# Patient Record
Sex: FEMALE | Race: BLACK OR AFRICAN AMERICAN | NOT HISPANIC OR LATINO | URBAN - METROPOLITAN AREA
[De-identification: names, ages, dates, MRNs, and addresses within clinical notes are randomized per-mention and may not be internally consistent; named-entity substitution may affect disease eponyms.]

---

## 2024-08-01 PROBLEM — I95.9 HYPOTENSION: Status: ACTIVE | Noted: 2024-08-01

## 2024-09-03 ENCOUNTER — EMERGENCY (EMERGENCY)
Facility: HOSPITAL | Age: 54
LOS: 1 days | Discharge: HOME | End: 2024-09-03
Attending: EMERGENCY MEDICINE | Admitting: EMERGENCY MEDICINE
Payer: COMMERCIAL

## 2024-09-03 VITALS
RESPIRATION RATE: 16 BRPM | DIASTOLIC BLOOD PRESSURE: 80 MMHG | SYSTOLIC BLOOD PRESSURE: 116 MMHG | HEART RATE: 88 BPM | WEIGHT: 154.98 LBS | OXYGEN SATURATION: 99 % | TEMPERATURE: 98 F | HEIGHT: 66 IN

## 2024-09-03 VITALS
SYSTOLIC BLOOD PRESSURE: 122 MMHG | RESPIRATION RATE: 17 BRPM | TEMPERATURE: 98 F | OXYGEN SATURATION: 97 % | HEART RATE: 92 BPM | DIASTOLIC BLOOD PRESSURE: 67 MMHG

## 2024-09-03 PROBLEM — K52.9 COLITIS: Status: ACTIVE | Noted: 2024-09-03

## 2024-09-03 PROBLEM — R19.7 BLOODY DIARRHEA: Status: ACTIVE | Noted: 2024-09-03

## 2024-09-03 LAB
ALBUMIN SERPL ELPH-MCNC: 4.3 G/DL — SIGNIFICANT CHANGE UP (ref 3.3–5)
ALP SERPL-CCNC: 49 U/L — SIGNIFICANT CHANGE UP (ref 40–120)
ALT FLD-CCNC: 20 U/L — SIGNIFICANT CHANGE UP (ref 10–45)
ANION GAP SERPL CALC-SCNC: 10 MMOL/L — SIGNIFICANT CHANGE UP (ref 5–17)
APPEARANCE UR: ABNORMAL
APTT BLD: 29.6 SEC — SIGNIFICANT CHANGE UP (ref 24.5–35.6)
AST SERPL-CCNC: 22 U/L — SIGNIFICANT CHANGE UP (ref 10–40)
BACTERIA # UR AUTO: NEGATIVE /HPF — SIGNIFICANT CHANGE UP
BASOPHILS # BLD AUTO: 0.03 K/UL — SIGNIFICANT CHANGE UP (ref 0–0.2)
BASOPHILS NFR BLD AUTO: 0.5 % — SIGNIFICANT CHANGE UP (ref 0–2)
BILIRUB SERPL-MCNC: 0.8 MG/DL — SIGNIFICANT CHANGE UP (ref 0.2–1.2)
BILIRUB UR-MCNC: ABNORMAL
BLD GP AB SCN SERPL QL: POSITIVE — SIGNIFICANT CHANGE UP
BUN SERPL-MCNC: 25 MG/DL — HIGH (ref 7–23)
CALCIUM SERPL-MCNC: 9.5 MG/DL — SIGNIFICANT CHANGE UP (ref 8.4–10.5)
CAST: 5 /LPF — HIGH (ref 0–4)
CHLORIDE SERPL-SCNC: 99 MMOL/L — SIGNIFICANT CHANGE UP (ref 96–108)
CO2 SERPL-SCNC: 28 MMOL/L — SIGNIFICANT CHANGE UP (ref 22–31)
COLOR SPEC: SIGNIFICANT CHANGE UP
CREAT SERPL-MCNC: 1.1 MG/DL — SIGNIFICANT CHANGE UP (ref 0.5–1.3)
DIFF PNL FLD: NEGATIVE — SIGNIFICANT CHANGE UP
EGFR: 59 ML/MIN/1.73M2 — LOW
EOSINOPHIL # BLD AUTO: 0.28 K/UL — SIGNIFICANT CHANGE UP (ref 0–0.5)
EOSINOPHIL NFR BLD AUTO: 4.9 % — SIGNIFICANT CHANGE UP (ref 0–6)
GLUCOSE SERPL-MCNC: 81 MG/DL — SIGNIFICANT CHANGE UP (ref 70–99)
GLUCOSE UR QL: NEGATIVE MG/DL — SIGNIFICANT CHANGE UP
HCT VFR BLD CALC: 34.1 % — LOW (ref 34.5–45)
HGB BLD-MCNC: 10.7 G/DL — LOW (ref 11.5–15.5)
HYALINE CASTS # UR AUTO: PRESENT
IMM GRANULOCYTES NFR BLD AUTO: 0.2 % — SIGNIFICANT CHANGE UP (ref 0–0.9)
INR BLD: 1.05 — SIGNIFICANT CHANGE UP (ref 0.85–1.18)
KETONES UR-MCNC: 15 MG/DL
LACTATE SERPL-SCNC: 0.6 MMOL/L — SIGNIFICANT CHANGE UP (ref 0.5–2)
LEUKOCYTE ESTERASE UR-ACNC: ABNORMAL
LIDOCAIN IGE QN: 38 U/L — SIGNIFICANT CHANGE UP (ref 7–60)
LYMPHOCYTES # BLD AUTO: 2.34 K/UL — SIGNIFICANT CHANGE UP (ref 1–3.3)
LYMPHOCYTES # BLD AUTO: 41.3 % — SIGNIFICANT CHANGE UP (ref 13–44)
MCHC RBC-ENTMCNC: 27.6 PG — SIGNIFICANT CHANGE UP (ref 27–34)
MCHC RBC-ENTMCNC: 31.4 GM/DL — LOW (ref 32–36)
MCV RBC AUTO: 88.1 FL — SIGNIFICANT CHANGE UP (ref 80–100)
MONOCYTES # BLD AUTO: 0.59 K/UL — SIGNIFICANT CHANGE UP (ref 0–0.9)
MONOCYTES NFR BLD AUTO: 10.4 % — SIGNIFICANT CHANGE UP (ref 2–14)
MUCOUS THREADS # UR AUTO: PRESENT
NEUTROPHILS # BLD AUTO: 2.42 K/UL — SIGNIFICANT CHANGE UP (ref 1.8–7.4)
NEUTROPHILS NFR BLD AUTO: 42.7 % — LOW (ref 43–77)
NITRITE UR-MCNC: NEGATIVE — SIGNIFICANT CHANGE UP
NRBC # BLD: 0 /100 WBCS — SIGNIFICANT CHANGE UP (ref 0–0)
PH UR: 5.5 — SIGNIFICANT CHANGE UP (ref 5–8)
PLATELET # BLD AUTO: 251 K/UL — SIGNIFICANT CHANGE UP (ref 150–400)
POTASSIUM SERPL-MCNC: 3.9 MMOL/L — SIGNIFICANT CHANGE UP (ref 3.5–5.3)
POTASSIUM SERPL-SCNC: 3.9 MMOL/L — SIGNIFICANT CHANGE UP (ref 3.5–5.3)
PROT SERPL-MCNC: 7.4 G/DL — SIGNIFICANT CHANGE UP (ref 6–8.3)
PROT UR-MCNC: 30 MG/DL
PROTHROM AB SERPL-ACNC: 11.9 SEC — SIGNIFICANT CHANGE UP (ref 9.5–13)
RBC # BLD: 3.87 M/UL — SIGNIFICANT CHANGE UP (ref 3.8–5.2)
RBC # FLD: 12.7 % — SIGNIFICANT CHANGE UP (ref 10.3–14.5)
RBC CASTS # UR COMP ASSIST: 3 /HPF — SIGNIFICANT CHANGE UP (ref 0–4)
RENAL EPI CELLS # UR COMP ASSIST: PRESENT
RH IG SCN BLD-IMP: POSITIVE — SIGNIFICANT CHANGE UP
RH IG SCN BLD-IMP: POSITIVE — SIGNIFICANT CHANGE UP
SODIUM SERPL-SCNC: 137 MMOL/L — SIGNIFICANT CHANGE UP (ref 135–145)
SP GR SPEC: 1.03 — SIGNIFICANT CHANGE UP (ref 1–1.03)
SQUAMOUS # UR AUTO: 18 /HPF — HIGH (ref 0–5)
UROBILINOGEN FLD QL: 1 MG/DL — SIGNIFICANT CHANGE UP (ref 0.2–1)
WBC # BLD: 5.67 K/UL — SIGNIFICANT CHANGE UP (ref 3.8–10.5)
WBC # FLD AUTO: 5.67 K/UL — SIGNIFICANT CHANGE UP (ref 3.8–10.5)
WBC UR QL: 48 /HPF — HIGH (ref 0–5)

## 2024-09-03 PROCEDURE — 36415 COLL VENOUS BLD VENIPUNCTURE: CPT

## 2024-09-03 PROCEDURE — 99285 EMERGENCY DEPT VISIT HI MDM: CPT

## 2024-09-03 PROCEDURE — 80053 COMPREHEN METABOLIC PANEL: CPT

## 2024-09-03 PROCEDURE — 83605 ASSAY OF LACTIC ACID: CPT

## 2024-09-03 PROCEDURE — 81001 URINALYSIS AUTO W/SCOPE: CPT

## 2024-09-03 PROCEDURE — 87086 URINE CULTURE/COLONY COUNT: CPT

## 2024-09-03 PROCEDURE — 86880 COOMBS TEST DIRECT: CPT

## 2024-09-03 PROCEDURE — 99284 EMERGENCY DEPT VISIT MOD MDM: CPT | Mod: 25

## 2024-09-03 PROCEDURE — 85730 THROMBOPLASTIN TIME PARTIAL: CPT

## 2024-09-03 PROCEDURE — 85610 PROTHROMBIN TIME: CPT

## 2024-09-03 PROCEDURE — 74177 CT ABD & PELVIS W/CONTRAST: CPT | Mod: MC

## 2024-09-03 PROCEDURE — 86870 RBC ANTIBODY IDENTIFICATION: CPT

## 2024-09-03 PROCEDURE — 83690 ASSAY OF LIPASE: CPT

## 2024-09-03 PROCEDURE — 85025 COMPLETE CBC W/AUTO DIFF WBC: CPT

## 2024-09-03 PROCEDURE — 86850 RBC ANTIBODY SCREEN: CPT

## 2024-09-03 PROCEDURE — 86900 BLOOD TYPING SEROLOGIC ABO: CPT

## 2024-09-03 PROCEDURE — 86077 PHYS BLOOD BANK SERV XMATCH: CPT

## 2024-09-03 PROCEDURE — 86901 BLOOD TYPING SEROLOGIC RH(D): CPT

## 2024-09-03 PROCEDURE — 74177 CT ABD & PELVIS W/CONTRAST: CPT | Mod: 26,MC

## 2024-09-03 RX ORDER — SODIUM CHLORIDE 9 MG/ML
1000 INJECTION INTRAMUSCULAR; INTRAVENOUS; SUBCUTANEOUS ONCE
Refills: 0 | Status: COMPLETED | OUTPATIENT
Start: 2024-09-03 | End: 2024-09-03

## 2024-09-03 RX ORDER — IOHEXOL 350 MG/ML
30 INJECTION, SOLUTION INTRAVENOUS ONCE
Refills: 0 | Status: COMPLETED | OUTPATIENT
Start: 2024-09-03 | End: 2024-09-03

## 2024-09-03 RX ADMIN — IOHEXOL 30 MILLILITER(S): 350 INJECTION, SOLUTION INTRAVENOUS at 10:49

## 2024-09-03 RX ADMIN — SODIUM CHLORIDE 1000 MILLILITER(S): 9 INJECTION INTRAMUSCULAR; INTRAVENOUS; SUBCUTANEOUS at 10:50

## 2024-09-03 NOTE — ED PROVIDER NOTE - PHYSICAL EXAMINATION
Vitals reviewed  Gen: well appearing, nad, speaking in full sentences  Skin: wwp, no rash/lesions  HEENT: ncat, eomi, mmm, airway patent   CV: rrr, no audible m/r/g  Resp: symmetrical expansion, ctab, no w/r/r  Abd: nondistended, soft, diffuse lower abd ttp, no r/g, no cvat   TIM ****  Ext: FROM throughout, no peripheral edema, no muscle or joint ttp, distal pulses 2+  Neuro: alert/oriented, no focal deficits, steady gait Vitals reviewed  Gen: well appearing, nad, speaking in full sentences  Skin: wwp, no rash/lesions  HEENT: ncat, eomi, mmm, airway patent   CV: rrr, no audible m/r/g  Resp: symmetrical expansion, ctab, no w/r/r  Abd: nondistended, soft, diffuse lower abd ttp, no r/g, no cvat   TIM: no hemorrhoids or mass, no blood or melena noted   Ext: FROM throughout, no peripheral edema, no muscle or joint ttp, distal pulses 2+  Neuro: alert/oriented, no focal deficits, steady gait

## 2024-09-03 NOTE — ED ADULT TRIAGE NOTE - CHIEF COMPLAINT QUOTE
Pt reports bright red blood in stool since Sunday with accompanying abdominal pain and nausea/vomiting. Denies dizziness, weakness, SOB, or chest pain. Pt reports hx of hemorrhoids but denies visualizing or feeling said hemorrhoids recently. Denies GI hx. Alert, oriented, and ambulatory at time of triage.

## 2024-09-03 NOTE — ED PROVIDER NOTE - NSFOLLOWUPINSTRUCTIONS_ED_ALL_ED_FT
Please rest and remain well hydrated with plenty of fluids.  You can take motrin 600-800mg and tylenol 650mg every 3 hours, switching between the two for pain/bodyaches or fevers (>100.4F/>38C)    Can call [phone] to schedule appointment with gastroenterologist    Please call to arrange follow up with primary care doctor within one week    Colitis    WHAT YOU NEED TO KNOW:    Colitis is swelling and irritation of your colon. Colitis may be caused by ulcers or a problem with your immune system. Bacteria, a virus, or a parasite may also cause colitis. The cause may not be known. You may have diarrhea, abdominal pain, fever, or blood or mucus in your bowel movement.    DISCHARGE INSTRUCTIONS:    Return to the emergency department if:    You have sudden trouble breathing.    Your bowel movements are black or have blood in them.    You have blood in your vomit.    You have severe abdominal pain or your abdomen is swollen and feels hard.    You have any of the following signs of dehydration:  Dizziness or weakness    Dry mouth, cracked lips, or severe thirst    Fast heartbeat or breathing    Urinating very little or not at all  Call your doctor if:    Your symptoms get worse or do not go away.    You have a fever, chills, cough, or feel weak and achy.    You suddenly lose weight without trying.    You have questions or concerns about your condition or care.  Medicines:    Medicines may be given to decrease inflammation in your colon and treat diarrhea.    Take your medicine as directed. Contact your healthcare provider if you think your medicine is not helping or if you have side effects. Tell your provider if you are allergic to any medicine. Keep a list of the medicines, vitamins, and herbs you take. Include the amounts, and when and why you take them. Bring the list or the pill bottles to follow-up visits. Carry your medicine list with you in case of an emergency.  Manage your symptoms:    Drink liquids as directed to help prevent dehydration. Good liquids to drink include water, juice, and broth. Ask how much liquid to drink each day. You may need to drink an oral rehydration solution (ORS). An ORS contains a balance of water, salt, and sugar to replace body fluids lost during diarrhea.    Eat a variety of healthy foods. Healthy foods include fruits, vegetables, whole-grain breads, beans, low-fat dairy products, lean meats, and fish. You may need to eat several small meals throughout the day instead of large meals. Avoid spicy foods, caffeine, chocolate, and foods high in fat.    Talk to your healthcare provider before you take NSAIDs. NSAIDs can cause worsen your symptoms if ulcers are causing your colitis.    Start to exercise when you feel better. Regular exercise helps your bowels work normally. Ask about the best exercise plan for you.  Prevent the spread of germs:      Wash your hands often. Wash your hands several times each day. Wash after you use the bathroom, change a child's diaper, and before you prepare or eat food. Use soap and water every time. Rub your soapy hands together, lacing your fingers. Wash the front and back of your hands, and in between your fingers. Use the fingers of one hand to scrub under the fingernails of the other hand. Wash for at least 20 seconds. Rinse with warm, running water for several seconds. Then dry your hands with a clean towel or paper towel. Use hand  that contains alcohol if soap and water are not available. Do not touch your eyes, nose, or mouth without washing your hands first.  Handwashing      Cover a sneeze or cough. Use a tissue that covers your mouth and nose. Throw the tissue away in a trash can right away. Use the bend of your arm if a tissue is not available. Wash your hands well with soap and water or use a hand .    Clean surfaces often. Clean doorknobs, countertops, cell phones, and other surfaces that are touched often. Use a disinfecting wipe, a single-use sponge, or a cloth you can wash and reuse. Use disinfecting  if you do not have wipes. You can create a disinfecting  by mixing 1 part bleach with 10 parts water.    Ask about vaccines you may need. Vaccines help prevent disease caused by some viruses and bacteria. Get the influenza (flu) vaccine as soon as recommended each year. The flu vaccine is usually available starting in September or October. Flu viruses change, so it is important to get a flu vaccine every year. Get the pneumonia vaccine if recommended. This vaccine is usually recommended every 5 years. Your provider will tell you when to get this vaccine, if needed. Your healthcare provider can tell you if you should get other vaccines, and when to get them.  Follow up with your doctor as directed: You may need to return for a colonoscopy or other tests. Write down how often you have a bowel movements and what they look like. Bring this to your follow-up visits. Write down your questions so you remember to ask them during your visits.

## 2024-09-03 NOTE — ED PROVIDER NOTE - PATIENT PORTAL LINK FT
You can access the FollowMyHealth Patient Portal offered by [facility] by registering at the following website: [email or web address]. By joining [employer]s FollowMyHealth portal, you will also be able to view your health information using other applications (apps) compatible with our system.

## 2024-09-03 NOTE — ED PROVIDER NOTE - ATTENDING APP SHARED VISIT CONTRIBUTION OF CARE
Vitals wnl, exam as above.   Hgb 10.7, other labs grossly wnl.   UA moderate leuk esterase, 48 WBCs, 18 epithelial cells - has no urinary symptoms, very unlikely UTI  CT showed "IMPRESSION: Abnormal colonic wall thickening extending from the splenic flexure to   the rectum, compatible with a colitis, likely of either infectious or inflammatory. Low suspicion for ischemic etiology as the mesenteric vessels appear patent"  Pt feeling much better, currently asymptomatic. Abd soft NTND. No significant bleeding while in ED. Has no systemic symptoms.   Discussed all results with patient, including any incidental radiological findings and lab abnormalities. Given copy of results and instructed to bring copy to primary doctor.   Discussed importance of outpt follow up and return precautions. Clinically no indication for further emergent ED workup or hospitalization at this time. Stable for dc, outpt f/u.

## 2024-09-03 NOTE — ED PROVIDER NOTE - NSICDXPASTMEDICALHX_GEN_ALL_CORE_FT
PAST MEDICAL HISTORY:  Asthma Asthma    Essential hypertension Hypertension    Syncope and collapse Vasovagal syncope

## 2024-09-03 NOTE — ED PROVIDER NOTE - CLINICAL SUMMARY MEDICAL DECISION MAKING FREE TEXT BOX
POST A,. 54 F pmh htn p/w abd pain, NVD x 3 days.  nbnb emesis Sunday resolved but persistent abd pain and bright red bloody stools (has pictures and noted to be bloody stool).  on exam vss, afebrile, comfortable appearing, diffuse lower abd ttp, no r/g, no cvat.  TIM *** likely LGIB 2/2 viral vs bacterial gastroenteritis vs colitis vs r/o mass.  will obtain labs, ct a/p and give ivf 54 F pmh htn p/w abd pain, NVD x 3 days.  nbnb emesis Sunday resolved but persistent abd pain and bright red bloody stools (has pictures and noted to be bloody stool).  on exam vss, afebrile, comfortable appearing, diffuse lower abd ttp, no r/g, no cvat. TIM: no hemorrhoids or mass, no blood or melena noted likely LGIB 2/2 viral vs bacterial gastroenteritis vs colitis vs r/o mass.  will obtain labs, ct a/p and give ivf    labs wnl, stable h/h and no leukocytosis,  ct shows findings c/w colitis, normal lactate do not think ischemic colitis.  afebrile and no leukocytosis do not think pt needs abx.  no BM to send stool while in ED.  recommend po hydration and otc analgesia.  f/u with gi outpt.  discussed strict return parameters

## 2024-09-03 NOTE — ED PROVIDER NOTE - OBJECTIVE STATEMENT
54 F pmh htn p/w abd pain, NVD x 3 days.  pt reports sunday evening after eating she developed lower abd pain/cramping w/ loose bright red bloody stool and nbnb emesis.  reports vomiting resolved but persistent bright red loose stools approx 6-8 episodes.  she reports feeling lightheaded as well.  shes been taking pepto bismol and drinking pedialyte.  denies f/c, HA, fainting, chest pain, sob, melena, urinary sxs, sick contacts, travel, trauma.  no one sick w/ similar sxs

## 2024-09-03 NOTE — ED ADULT NURSE NOTE - NSFALLRISKFACTORS_ED_ALL_ED
Quality 431: Preventive Care And Screening: Unhealthy Alcohol Use - Screening: Patient not identified as an unhealthy alcohol user when screened for unhealthy alcohol use using a systematic screening method Quality 110: Preventive Care And Screening: Influenza Immunization: Influenza Immunization not Administered due to Patient Allergy. Detail Level: Detailed No indicators present

## 2024-09-03 NOTE — ED ADULT NURSE NOTE - OBJECTIVE STATEMENT
Pt A&Ox4 and able to speak in complete sentences. Pt breathing even and unlabored with equal chest rise and fall. Pt arrived  d/t n, v, d, abd pain, and RBB stools,. PMH HTN.

## 2024-09-03 NOTE — ED ADULT NURSE NOTE - NSFALLUNIVINTERV_ED_ALL_ED
Bed/Stretcher in lowest position, wheels locked, appropriate side rails in place/Call bell, personal items and telephone in reach/Instruct patient to call for assistance before getting out of bed/chair/stretcher/Non-slip footwear applied when patient is off stretcher/[place] to call system/Physically safe environment - no spills, clutter or unnecessary equipment/Purposeful proactive rounding/Room/bathroom lighting operational, light cord in reach

## 2024-09-04 LAB
CULTURE RESULTS: SIGNIFICANT CHANGE UP
SPECIMEN SOURCE: SIGNIFICANT CHANGE UP

## 2024-09-06 DIAGNOSIS — I10 ESSENTIAL (PRIMARY) HYPERTENSION: ICD-10-CM

## 2024-09-06 DIAGNOSIS — R42 DIZZINESS AND GIDDINESS: ICD-10-CM

## 2024-09-06 DIAGNOSIS — Z88.8 ALLERGY STATUS TO OTHER DRUGS, MEDICAMENTS AND BIOLOGICAL SUBSTANCES: ICD-10-CM

## 2024-09-06 DIAGNOSIS — K52.9 NONINFECTIVE GASTROENTERITIS AND COLITIS, UNSPECIFIED: ICD-10-CM

## 2024-09-06 DIAGNOSIS — Z88.2 ALLERGY STATUS TO SULFONAMIDES: ICD-10-CM

## 2024-09-06 DIAGNOSIS — R10.30 LOWER ABDOMINAL PAIN, UNSPECIFIED: ICD-10-CM

## 2024-10-21 ENCOUNTER — APPOINTMENT (OUTPATIENT)
Dept: GASTROENTEROLOGY | Facility: CLINIC | Age: 54
End: 2024-10-21

## 2024-11-07 ENCOUNTER — RX RENEWAL (OUTPATIENT)
Age: 54
End: 2024-11-07

## 2024-11-07 ENCOUNTER — TRANSCRIPTION ENCOUNTER (OUTPATIENT)
Age: 54
End: 2024-11-07

## 2024-11-07 RX ORDER — ALBUTEROL SULFATE 90 UG/1
108 (90 BASE) INHALANT RESPIRATORY (INHALATION)
Qty: 1 | Refills: 2 | Status: ACTIVE | COMMUNITY
Start: 2024-11-07 | End: 1900-01-01